# Patient Record
Sex: MALE | Race: NATIVE HAWAIIAN OR OTHER PACIFIC ISLANDER
[De-identification: names, ages, dates, MRNs, and addresses within clinical notes are randomized per-mention and may not be internally consistent; named-entity substitution may affect disease eponyms.]

---

## 2020-02-19 NOTE — SHORT STAY SUMMARY
Short Stay Documentation


Date of service: 02/20/20





- History


H&P: obtained from office





- Allergies and Medications


Current Medications: 


                                    Allergies





No Known Allergies Allergy (Verified 02/19/20 12:45)


   





                                Home Medications











 Medication  Instructions  Recorded  Confirmed  Last Taken  Type


 


No Known Home Medications [No  02/19/20 02/19/20 Unknown History





Reported Home Medications]     














- Brief post op/procedure progress note


Date of procedure: 02/20/20 (dictation:[id number])


Pre-op diagnosis: incarcerated umbilical hernia


Post-op diagnosis: same


Procedure: 





robotic assisted lap umbo hernia repair





IVF 1500cc


EBL ~50cc


Anesthesia: GETA


Findings: 





2cm fascial defect at umbo


incarcerated omentum and preperitoneal fat


Surgeon: NAHED CERVANTES


Assistant: SALVADOR CABRERA


Estimated blood loss: 50-100ml


Pathology: none


Condition: stable





- Hospital course


Hospital course: 





uneventful





- Disposition


Condition at discharge: Stable


Disposition: DC-01 TO HOME OR SELFCARE





Short Stay Discharge Plan


Activity: advance as tolerated


Diet: regular


Wound: open to air, keep clean and dry


Special Instructions: no heavy lifting


Additional Instructions: 


Post Operative Instructions





Activity: no heavy lifting for next 1 week. 





May shower tomorrow.  Pat dry the wound or wounds. Keep incision sites clean and

dry.





Change gauze over belly button daily. 





Wear abdominal binder daily. 





After surgery, start with a light diet. Consider starting with liquids. If you 

do well, you can advance to a regular diet as you feel comfortable.





Apply an ice pack to the wound or wounds for 10-20 minutes at a time.  Do this 

at least 4-5 times a day.  You can do it more if he would like. 





Pain Medication Schedule for the first 2 days after surgery:


Gabapentin 900mg twice a day


Celebrex (celecoxib) 200mg twice a day


Tylenol 500mg four times a day (every 6 hours)





After the first 2 days, then take alternating doses of ibuprofen and Tylenol as 

needed for pain. Take 600 mg of ibuprofen every 6 hours as needed.  Take 500 mg 

of Tylenol every 6 hours as needed.    You should alternate these 2 medicines.  

Make sure you take the ibuprofen with food. 





 It is very important that you use the prescription narcotic pain medicine 

(oxycodone) only for very severe pain. Do not take the narcotic medicine before 

you try using all the medications listed above.





We will call you in a couple of days to see how youre doing.  If you have any 

questions or concerns, always feel free to call the clinic ([phone]) at any

time.


Follow up with: 


CHAITANYA MADDEN MD [Referring] - 7 Days


NAHED CERVANTES MD [Staff Physician] - 14 Days


Forms:  [facility]


Prescriptions: 


Celecoxib [celeBREX] 200 mg PO BID #4 capsule


Gabapentin 900 mg PO BID #12 cap


Oxycodone HCl/Acetaminophen [Percocet 7.5/325 mg] 1 each PO Q6HR PRN #20 tablet


 PRN Reason: Pain , Severe (7-10)

## 2020-02-20 ENCOUNTER — HOSPITAL ENCOUNTER (OUTPATIENT)
Dept: HOSPITAL 5 - OR | Age: 55
Discharge: HOME | End: 2020-02-20
Attending: SURGERY
Payer: COMMERCIAL

## 2020-02-20 VITALS — SYSTOLIC BLOOD PRESSURE: 152 MMHG | DIASTOLIC BLOOD PRESSURE: 91 MMHG

## 2020-02-20 DIAGNOSIS — Z79.899: ICD-10-CM

## 2020-02-20 DIAGNOSIS — E78.00: ICD-10-CM

## 2020-02-20 DIAGNOSIS — Z87.891: ICD-10-CM

## 2020-02-20 DIAGNOSIS — K42.0: Primary | ICD-10-CM

## 2020-02-20 DIAGNOSIS — K21.9: ICD-10-CM

## 2020-02-20 DIAGNOSIS — M79.89: ICD-10-CM

## 2020-02-20 DIAGNOSIS — M27.1: ICD-10-CM

## 2020-02-20 DIAGNOSIS — Z90.49: ICD-10-CM

## 2020-02-20 DIAGNOSIS — Z72.89: ICD-10-CM

## 2020-02-20 DIAGNOSIS — Z98.890: ICD-10-CM

## 2020-02-20 DIAGNOSIS — I10: ICD-10-CM

## 2020-02-20 PROCEDURE — 49653: CPT

## 2020-02-20 PROCEDURE — 26160 REMOVE TENDON SHEATH LESION: CPT

## 2020-02-20 PROCEDURE — C1781 MESH (IMPLANTABLE): HCPCS

## 2020-02-20 PROCEDURE — 88307 TISSUE EXAM BY PATHOLOGIST: CPT

## 2020-02-20 PROCEDURE — 88305 TISSUE EXAM BY PATHOLOGIST: CPT

## 2020-02-20 PROCEDURE — 88311 DECALCIFY TISSUE: CPT

## 2020-02-20 NOTE — ANESTHESIA CONSULTATION
Anesthesia Consult and Med Hx


Date of service: 02/20/20





- Airway


Anesthetic Teeth Evaluation: Good


ROM Head & Neck: Adequate


Mental/Hyoid Distance: Adequate


Mallampati Class: Class II


Intubation Access Assessment: Good





- Pulmonary Exam


CTA: Yes





- Cardiac Exam


Cardiac Exam: No Murmur





- Pre-Operative Health Status


ASA Pre-Surgery Classification: ASA2





- Pulmonary


Hx Smoking: Yes (IN HIS 20'S QUIT OVER 10YRS AGO)





- Cardiovascular System


Hx Hypertension: Yes (2017)





- Central Nervous System


Hx Psychiatric Problems: No





- Other Systems


Hx Alcohol Use: Yes ([facility])


Hx Substance Use: No


Hx Cancer: No

## 2020-02-22 NOTE — OPERATIVE REPORT
PREOPERATIVE DIAGNOSIS:  Incarcerated umbilical hernia.



POSTOPERATIVE DIAGNOSIS:  Incarcerated umbilical hernia.



PROCEDURE:  Robotic-assisted laparoscopic umbilical hernia repair.



ATTENDING PHYSICIAN:  Surinder Hung MD



ASSISTANT:  Dr. Julio.



ANESTHESIA:  General.



ESTIMATED BLOOD LOSS:  Approximately 50 mL.



FLUIDS:  1500 mL.



FINDINGS:  Large amount of omental fat was incarcerated in the umbilical hernia,

fascial defect was approximately 2 cm.  Some preperitoneal fat was also

incarcerated in the umbilical hernia.



IMPLANTS:  Prolene mesh cut to about 10 x 10 cm.



DRAINS:  None.



COMPLICATIONS:  None.



DISPOSITION:  Stable, transferred to Recovery Room.



INDICATIONS:  This is a 54-year-old male who presented to the clinic with

complaints of enlarging umbilical hernia and that was very painful.  The patient

is assessed to be in need for robotic repair.  Procedure, risks, benefits were

explained to the patient.  Risks included but were not limited to infection,

bleeding, pain, injury to surrounding structures, possible need for further

surgery in the future, possible recurrence.  The patient understood and

consented.



OPERATIVE NOTE:  The patient was brought to the operating room and placed on the

table in supine position.  After adequate general anesthesia was established,

the patient was prepped and draped in usual sterile fashion.  SCDs were in

place.  Antibiotics have been given.  All pressure points were padded.  Timeout

was called.  We began by placing a Veress needle in left upper quadrant.  I was

able to insufflate on the first attempt.  Using the Optiview technique, we

placed a 5-mm port in the right upper quadrant.  We entered the peritoneal

cavity safely.  We examined the area where the Veress needle was.  There was no

injury to the underlying structures.  Veress needle was removed.  Under direct

vision, we placed a 12-mm port in the mid portion of the right side of the

abdomen, an 8-mm port in the right lower quadrant and then replaced the 5-mm

port with an 8.  I began by creating the preperitoneal flap and I was able to

successfully reduce all of the contents.  A large amount of omental fat was

stuck in the umbilical hernia.  We completely reduced the incarcerated tissue as

well as create a large peritoneal flap.  I then closed the fascial defect with a

0 V-Loc suture after we made sure there was nothing left in the hernia sac.  We

did not tack down the hernia sac as the skin was fairly thin from probably a

prolonged period of stretching of the tissue from the hernia.  I then

attached the mesh to the center of the fascial repair with a 2-0 V-Loc suture

and then brought it out towards me so that I would eventually use it to help

close the peritoneum.  We had excellent hemostasis and the mesh laid very well. 
I

then used a 3-0 V-Loc suture to close the peritoneal defect.  We used a portion

of that 2-0 V-Loc that we used for the mesh also to reinforce the peritoneal 
closure.  There

was a small opening where we had removed the peritoneum from the umbilical

hernia.  This was closed with a 3-0 V-Loc suture.  Everything was very

hemostatic.  We had no problems, no issues, we then converted back to

laparoscopic.  We removed all the needles, count was correct, we removed the

Ray-Flory gauze that we had placed in it and we closed the 12 mm port site with a

Chele-Nicolás fascial closure device using a 2-0 PDS suture.  Skin was cleaned

and dried.  Additional local was injected, 4-0 Monocryl was used to close the

skin sites with interrupted subcuticular stitches.  Skin was cleaned and dried,

dressings were placed.  We placed a wad of gauze in the umbilicus to push down

the tissue hoping to help with scar down and cover that with a Tegaderm.  The

patient tolerated the procedure well.  There were no complications.  All counts

were correct at the end of the case.





DD: 02/22/2020 18:45

DT: 02/22/2020 19:43

JOB# [id number]  [id number]

LING/MIGUEL ANGEL RODRIGUEZ

## 2022-05-18 NOTE — PROCEDURE NOTE
Date of procedure: 02/20/20


Pre-op diagnosis: Mass right 3rd finger


Post-op diagnosis: same


Procedure: 





Excisional biopsy right third finger





Procedure


Patient was brought to the OR and placed on the OR table in the supine position 

following induction and intubation by anesthesia the patient's right upper 

extremity was prepped and draped in the usual sterile manner.  A timeout 

procedure was done to identify the patient and the correct operative site next 

the arm was exsanguinated followed by inflation of the pneumatic tourniquet to 

250 mmHg.  Using a bayonet type incision centered over the distal 

interphalangeal joint incision was carried down sharply through skin subcu 

tissue Next we encountered a brownish-yellow lobular mass overlying the distal 

phalanx this mass was shelled out from the surrounding tissue and given to scrub

nurse for pathological evaluation observing the surgical site did did not appear

to be any invasive type tissue noted the tendon extensor tendon appeared to be 

intact as well with no involvement of the nailbed following this the wound was 

irrigated the incision was closed with 3-0 nylon in an interrupted fashion 

routine postop dressings were applied the patient tolerated this portion of the 

procedure.  Patient also will undergo a robotically assisted repair of umbilical

hernia to be done by the general surgery staff


Anesthesia: CAMERONA


Surgeon: CAREY NICOLE


Estimated blood loss: minimal


Pathology: list (mass right 2nd finger)


Specimen disposition: to lab


Condition: stable


Disposition: PACU
Positive